# Patient Record
Sex: FEMALE | Race: WHITE | Employment: FULL TIME | URBAN - METROPOLITAN AREA
[De-identification: names, ages, dates, MRNs, and addresses within clinical notes are randomized per-mention and may not be internally consistent; named-entity substitution may affect disease eponyms.]

---

## 2017-04-12 ENCOUNTER — HOSPITAL ENCOUNTER (OUTPATIENT)
Dept: ULTRASOUND IMAGING | Age: 32
Discharge: HOME | End: 2017-04-12
Attending: FAMILY MEDICINE
Payer: COMMERCIAL

## 2017-04-12 DIAGNOSIS — K82.8 GALLBLADDER SLUDGE: ICD-10-CM

## 2017-04-12 DIAGNOSIS — R10.9 ABDOMINAL PAIN: ICD-10-CM

## 2017-04-12 PROCEDURE — 76705 ECHO EXAM OF ABDOMEN: CPT

## 2019-06-17 ENCOUNTER — HOSPITAL ENCOUNTER (EMERGENCY)
Age: 34
Discharge: HOME | End: 2019-06-17
Attending: EMERGENCY MEDICINE
Payer: COMMERCIAL

## 2019-06-17 ENCOUNTER — APPOINTMENT (OUTPATIENT)
Dept: ULTRASOUND IMAGING | Age: 34
End: 2019-06-17
Attending: EMERGENCY MEDICINE
Payer: COMMERCIAL

## 2019-06-17 ENCOUNTER — APPOINTMENT (OUTPATIENT)
Dept: CT IMAGING | Age: 34
End: 2019-06-17
Attending: EMERGENCY MEDICINE
Payer: COMMERCIAL

## 2019-06-17 ENCOUNTER — APPOINTMENT (OUTPATIENT)
Dept: GENERAL RADIOLOGY | Age: 34
End: 2019-06-17
Attending: EMERGENCY MEDICINE
Payer: COMMERCIAL

## 2019-06-17 VITALS
OXYGEN SATURATION: 94 % | TEMPERATURE: 98 F | SYSTOLIC BLOOD PRESSURE: 106 MMHG | HEART RATE: 76 BPM | RESPIRATION RATE: 19 BRPM | DIASTOLIC BLOOD PRESSURE: 69 MMHG

## 2019-06-17 DIAGNOSIS — G89.18 POSTOPERATIVE PAIN: Primary | ICD-10-CM

## 2019-06-17 LAB
ALBUMIN SERPL-MCNC: 3.7 G/DL (ref 3.4–5)
ALBUMIN/GLOB SERPL: 1 {RATIO} (ref 0.8–1.7)
ALP SERPL-CCNC: 81 U/L (ref 45–117)
ALT SERPL-CCNC: 48 U/L (ref 13–56)
ANION GAP SERPL CALC-SCNC: 7 MMOL/L (ref 3–18)
APPEARANCE UR: CLEAR
AST SERPL-CCNC: 22 U/L (ref 15–37)
ATRIAL RATE: 84 BPM
BACTERIA URNS QL MICRO: ABNORMAL /HPF
BASOPHILS # BLD: 0 K/UL (ref 0–0.1)
BASOPHILS NFR BLD: 0 % (ref 0–2)
BILIRUB SERPL-MCNC: 0.4 MG/DL (ref 0.2–1)
BILIRUB UR QL: NEGATIVE
BUN SERPL-MCNC: 10 MG/DL (ref 7–18)
BUN/CREAT SERPL: 14 (ref 12–20)
CALCIUM SERPL-MCNC: 8.9 MG/DL (ref 8.5–10.1)
CALCULATED P AXIS, ECG09: 9 DEGREES
CALCULATED R AXIS, ECG10: 15 DEGREES
CALCULATED T AXIS, ECG11: 14 DEGREES
CHLORIDE SERPL-SCNC: 103 MMOL/L (ref 100–108)
CK MB CFR SERPL CALC: NORMAL % (ref 0–4)
CK MB SERPL-MCNC: <1 NG/ML (ref 5–25)
CK SERPL-CCNC: 82 U/L (ref 26–192)
CO2 SERPL-SCNC: 29 MMOL/L (ref 21–32)
COLOR UR: YELLOW
CREAT SERPL-MCNC: 0.71 MG/DL (ref 0.6–1.3)
DIAGNOSIS, 93000: NORMAL
DIFFERENTIAL METHOD BLD: ABNORMAL
EOSINOPHIL # BLD: 0.2 K/UL (ref 0–0.4)
EOSINOPHIL NFR BLD: 2 % (ref 0–5)
EPITH CASTS URNS QL MICRO: ABNORMAL /LPF (ref 0–5)
ERYTHROCYTE [DISTWIDTH] IN BLOOD BY AUTOMATED COUNT: 13.2 % (ref 11.6–14.5)
GLOBULIN SER CALC-MCNC: 3.6 G/DL (ref 2–4)
GLUCOSE SERPL-MCNC: 179 MG/DL (ref 74–99)
GLUCOSE UR STRIP.AUTO-MCNC: NEGATIVE MG/DL
HCG UR QL: NEGATIVE
HCT VFR BLD AUTO: 33.6 % (ref 35–45)
HGB BLD-MCNC: 11.7 G/DL (ref 12–16)
HGB UR QL STRIP: ABNORMAL
KETONES UR QL STRIP.AUTO: ABNORMAL MG/DL
LEUKOCYTE ESTERASE UR QL STRIP.AUTO: ABNORMAL
LIPASE SERPL-CCNC: 276 U/L (ref 73–393)
LYMPHOCYTES # BLD: 3.6 K/UL (ref 0.9–3.6)
LYMPHOCYTES NFR BLD: 42 % (ref 21–52)
MCH RBC QN AUTO: 30 PG (ref 24–34)
MCHC RBC AUTO-ENTMCNC: 34.8 G/DL (ref 31–37)
MCV RBC AUTO: 86.2 FL (ref 74–97)
MONOCYTES # BLD: 0.3 K/UL (ref 0.05–1.2)
MONOCYTES NFR BLD: 4 % (ref 3–10)
MUCOUS THREADS URNS QL MICRO: ABNORMAL /LPF
NEUTS SEG # BLD: 4.3 K/UL (ref 1.8–8)
NEUTS SEG NFR BLD: 52 % (ref 40–73)
NITRITE UR QL STRIP.AUTO: NEGATIVE
P-R INTERVAL, ECG05: 162 MS
PH UR STRIP: 6.5 [PH] (ref 5–8)
PLATELET # BLD AUTO: 239 K/UL (ref 135–420)
PMV BLD AUTO: 10.7 FL (ref 9.2–11.8)
POTASSIUM SERPL-SCNC: 3.6 MMOL/L (ref 3.5–5.5)
PROT SERPL-MCNC: 7.3 G/DL (ref 6.4–8.2)
PROT UR STRIP-MCNC: 30 MG/DL
Q-T INTERVAL, ECG07: 376 MS
QRS DURATION, ECG06: 86 MS
QTC CALCULATION (BEZET), ECG08: 444 MS
RBC # BLD AUTO: 3.9 M/UL (ref 4.2–5.3)
RBC #/AREA URNS HPF: ABNORMAL /HPF (ref 0–5)
SODIUM SERPL-SCNC: 139 MMOL/L (ref 136–145)
SP GR UR REFRACTOMETRY: 1.02 (ref 1–1.03)
TROPONIN I SERPL-MCNC: <0.02 NG/ML (ref 0–0.04)
UROBILINOGEN UR QL STRIP.AUTO: 1 EU/DL (ref 0.2–1)
VENTRICULAR RATE, ECG03: 84 BPM
WBC # BLD AUTO: 8.4 K/UL (ref 4.6–13.2)
WBC URNS QL MICRO: ABNORMAL /HPF (ref 0–5)

## 2019-06-17 PROCEDURE — 74011250636 HC RX REV CODE- 250/636: Performed by: EMERGENCY MEDICINE

## 2019-06-17 PROCEDURE — 76705 ECHO EXAM OF ABDOMEN: CPT

## 2019-06-17 PROCEDURE — 96375 TX/PRO/DX INJ NEW DRUG ADDON: CPT

## 2019-06-17 PROCEDURE — 74011636320 HC RX REV CODE- 636/320: Performed by: EMERGENCY MEDICINE

## 2019-06-17 PROCEDURE — 83690 ASSAY OF LIPASE: CPT

## 2019-06-17 PROCEDURE — 71275 CT ANGIOGRAPHY CHEST: CPT

## 2019-06-17 PROCEDURE — 81001 URINALYSIS AUTO W/SCOPE: CPT

## 2019-06-17 PROCEDURE — 85025 COMPLETE CBC W/AUTO DIFF WBC: CPT

## 2019-06-17 PROCEDURE — 82550 ASSAY OF CK (CPK): CPT

## 2019-06-17 PROCEDURE — 71045 X-RAY EXAM CHEST 1 VIEW: CPT

## 2019-06-17 PROCEDURE — 80053 COMPREHEN METABOLIC PANEL: CPT

## 2019-06-17 PROCEDURE — 74011250636 HC RX REV CODE- 250/636

## 2019-06-17 PROCEDURE — 99283 EMERGENCY DEPT VISIT LOW MDM: CPT

## 2019-06-17 PROCEDURE — 96374 THER/PROPH/DIAG INJ IV PUSH: CPT

## 2019-06-17 PROCEDURE — 93005 ELECTROCARDIOGRAM TRACING: CPT

## 2019-06-17 PROCEDURE — 81025 URINE PREGNANCY TEST: CPT

## 2019-06-17 RX ORDER — KETOROLAC TROMETHAMINE 30 MG/ML
30 INJECTION, SOLUTION INTRAMUSCULAR; INTRAVENOUS
Status: COMPLETED | OUTPATIENT
Start: 2019-06-17 | End: 2019-06-17

## 2019-06-17 RX ORDER — FENTANYL CITRATE 50 UG/ML
50 INJECTION, SOLUTION INTRAMUSCULAR; INTRAVENOUS
Status: COMPLETED | OUTPATIENT
Start: 2019-06-17 | End: 2019-06-17

## 2019-06-17 RX ORDER — ONDANSETRON 2 MG/ML
INJECTION INTRAMUSCULAR; INTRAVENOUS
Status: DISCONTINUED
Start: 2019-06-17 | End: 2019-06-17 | Stop reason: HOSPADM

## 2019-06-17 RX ORDER — FENTANYL CITRATE 50 UG/ML
INJECTION, SOLUTION INTRAMUSCULAR; INTRAVENOUS
Status: DISCONTINUED
Start: 2019-06-17 | End: 2019-06-17 | Stop reason: HOSPADM

## 2019-06-17 RX ORDER — ONDANSETRON 2 MG/ML
4 INJECTION INTRAMUSCULAR; INTRAVENOUS
Status: COMPLETED | OUTPATIENT
Start: 2019-06-17 | End: 2019-06-17

## 2019-06-17 RX ADMIN — ONDANSETRON HYDROCHLORIDE 4 MG: 2 SOLUTION INTRAMUSCULAR; INTRAVENOUS at 01:35

## 2019-06-17 RX ADMIN — KETOROLAC TROMETHAMINE 30 MG: 30 INJECTION, SOLUTION INTRAMUSCULAR; INTRAVENOUS at 08:11

## 2019-06-17 RX ADMIN — FENTANYL CITRATE 50 MCG: 50 INJECTION, SOLUTION INTRAMUSCULAR; INTRAVENOUS at 01:35

## 2019-06-17 RX ADMIN — IOPAMIDOL 100 ML: 755 INJECTION, SOLUTION INTRAVENOUS at 05:37

## 2019-06-17 NOTE — DISCHARGE INSTRUCTIONS
Patient Education        Acute Pain After Surgery: Care Instructions  Your Care Instructions    It's common to have some pain after surgery. Pain doesn't mean that something is wrong or that the surgery didn't go well. But when the pain is severe, it's important to work with your doctor to manage it. It's also important to be aware of a few facts about pain and pain medicine. · You are the only person who knows what your pain feels like. So be sure to tell your doctor when you are in pain or when the pain changes. Then he or she will know how to adjust your medicines. · Pain is often easier to control right after it starts. So it may be better to take regular doses of pain medicine and not wait until the pain gets bad. · Medicine can help control pain. But this doesn't mean you'll have no pain. Medicine works to keep the pain at a level you can live with. With time, you will feel better. Follow-up care is a key part of your treatment and safety. Be sure to make and go to all appointments, and call your doctor if you are having problems. It's also a good idea to know your test results and keep a list of the medicines you take. How can you care for yourself at home? · Be safe with medicines. Read and follow all instructions on the label. ? If the doctor gave you a prescription medicine for pain, take it as prescribed. ? If you are not taking a prescription pain medicine, ask your doctor if you can take an over-the-counter medicine. · If you take an over-the-counter pain medicine, such as acetaminophen (Tylenol), ibuprofen (Advil, Motrin), or naproxen (Aleve), read and follow all instructions on the label. · Do not take two or more pain medicines at the same time unless the doctor told you to. · Do not drink alcohol while you are taking pain medicines. · Try to walk each day if your doctor recommends it. Start by walking a little more than you did the day before. Bit by bit, increase the amount you walk. Walking increases blood flow. It also helps prevent pneumonia and constipation. · To prevent constipation from opioid pain medicines:  ? Talk to your doctor about a laxative. ? Include fruits, vegetables, beans, and whole grains in your diet each day. These foods are high in fiber. ? Drink plenty of fluids, enough so that your urine is light yellow or clear like water. Drink water, fruit juice, or other drinks that do not contain caffeine or alcohol. If you have kidney, heart, or liver disease and have to limit fluids, talk with your doctor before you increase the amount of fluids you drink. ? Take a fiber supplement, such as Citrucel or Metamucil, every day if needed. Read and follow all instructions on the label. If you take pain medicine for more than a few days, talk to your doctor before you take fiber. When should you call for help? Call your doctor now or seek immediate medical care if:    · Your pain gets worse.     · Your pain is not controlled by medicine.    Watch closely for changes in your health, and be sure to contact your doctor if you have any problems. Where can you learn more? Go to http://krissy-filiberto.info/. Enter ([phone] in the search box to learn more about \"Acute Pain After Surgery: Care Instructions. \"  Current as of: September 23, 2018  Content Version: 11.9  © 4486-0829 [employer]. Care instructions adapted under license by [employer] (which disclaims liability or warranty for this information). If you have questions about a medical condition or this instruction, always ask your healthcare professional. [place] [address] any warranty or liability for your use of this information.

## 2019-06-17 NOTE — ED TRIAGE NOTES
Pt post op (Wednesday had laparoscopic cholecystectomy) c/o sudden onset of stabbing epigastric pain and significant chest pressure causing her to feel like she cannot take a deep breath. Afebrile, no dyspnea or distress, pt recently d/c'd Roxicodone and switched over to tylenol, denies motrin or Asprin. Incision sites appear appropriate in healing time and are intact, no signs of infection observed, VS WDL.

## 2019-06-17 NOTE — ED PROVIDER NOTES
EMERGENCY DEPARTMENT HISTORY AND PHYSICAL EXAM    1:07 AM      Date: 2019  Patient Name: Rambo Hammer    History of Presenting Illness     Chief Complaint   Patient presents with    Post-Op Problem         History Provided By: Patient      Additional History (Context): Rambo Hammer is a 34 y.o. female with asthma who presents with acute epigastric abdominal pain onset immediately PTA. Pt states she felt chest tightness and shortness of breath. Pt has laparoscopic cholecystectomy 19 at [facility]. Pt states she felt stabbing pain and called her surgeon. Surgeon told patient to go to [facility] but she states she did not think she could be able to make it there. She denies fever, vomiting, diarrhea, and wheezing. Pt has been taking tylenol for pain and stopped taking her prescribed Roxicodone on 6/15/19. No other concerns or symptoms at this time. PCP: Mathew Urena MD    Chief Complaint: Abdominal Pain  Duration:  immediately PTA  Timing:  Acute  Location: Epigastric  Quality: Cramping  Severity: N/A  Modifying Factors:  tylenol for pain and stopped taking her Rx pain medications on 6/15/19, Surgery on 19  Associated Symptoms: shortness of breath, chest tightness    Current Facility-Administered Medications   Medication Dose Route Frequency Provider Last Rate Last Dose    ondansetron (ZOFRAN) 4 mg/2 mL injection             fentaNYL citrate (PF) 50 mcg/mL injection              Current Outpatient Medications   Medication Sig Dispense Refill    albuterol (PROVENTIL HFA, VENTOLIN HFA, PROAIR HFA) 90 mcg/actuation inhaler Take 2 Puffs by inhalation every four (4) hours as needed for Wheezing or Shortness of Breath.  1 Inhaler 1       Past History     Past Medical History:  Past Medical History:   Diagnosis Date    Asthma        Past Surgical History:  Past Surgical History:   Procedure Laterality Date    HX APPENDECTOMY      HX  SECTION      HX DILATION AND CURETTAGE         Family History:  History reviewed. No pertinent family history. Social History:  Social History     Tobacco Use    Smoking status: Never Smoker   Substance Use Topics    Alcohol use: Yes    Drug use: Not on file       Allergies: Allergies   Allergen Reactions    Seafood Anaphylaxis    Amoxicillin Hives and Nausea and Vomiting    Erythromycin Hives and Nausea and Vomiting    Keflex [Cephalexin] Hives and Nausea and Vomiting    Sulfa (Sulfonamide Antibiotics) Hives and Nausea and Vomiting         Review of Systems       Review of Systems   Constitutional: Negative for activity change, fatigue and fever. HENT: Negative for congestion and rhinorrhea. Eyes: Negative for visual disturbance. Respiratory: Positive for chest tightness and shortness of breath. Negative for wheezing. Cardiovascular: Negative for chest pain and palpitations. Gastrointestinal: Positive for abdominal pain. Negative for diarrhea, nausea and vomiting. Genitourinary: Negative for dysuria and hematuria. Musculoskeletal: Negative for back pain. Skin: Negative for rash. Neurological: Negative for dizziness, weakness and light-headedness. All other systems reviewed and are negative. Physical Exam   There were no vitals taken for this visit. Physical Exam   Constitutional: She is oriented to person, place, and time. She appears well-developed and well-nourished. No distress. HENT:   Head: Normocephalic and atraumatic. Right Ear: External ear normal.   Left Ear: External ear normal.   Nose: Nose normal.   Mouth/Throat: Oropharynx is clear and moist.   Eyes: Pupils are equal, round, and reactive to light. Conjunctivae and EOM are normal.   Neck: Normal range of motion. Neck supple. No tracheal deviation present. Cardiovascular: Normal rate, regular rhythm and intact distal pulses. Pulmonary/Chest: Effort normal and breath sounds normal. She exhibits no tenderness. Abdominal: Soft.  Bowel sounds are normal. She exhibits no distension. There is tenderness in the right upper quadrant. There is no rebound and no guarding. Surgical incisions clean, dry, and intact with no erythema or discharge. Musculoskeletal: Normal range of motion. She exhibits no edema or tenderness. Neurological: She is alert and oriented to person, place, and time. No cranial nerve deficit. Coordination normal.   Skin: Skin is warm and dry. Psychiatric: She has a normal mood and affect. Her behavior is normal. Judgment and thought content normal.   Nursing note and vitals reviewed. Diagnostic Study Results     Labs -  Recent Results (from the past 12 hour(s))   CBC WITH AUTOMATED DIFF    Collection Time: 06/17/19  1:18 AM   Result Value Ref Range    WBC 8.4 4.6 - 13.2 K/uL    RBC 3.90 (L) 4.20 - 5.30 M/uL    HGB 11.7 (L) 12.0 - 16.0 g/dL    HCT 33.6 (L) 35.0 - 45.0 %    MCV 86.2 74.0 - 97.0 FL    MCH 30.0 24.0 - 34.0 PG    MCHC 34.8 31.0 - 37.0 g/dL    RDW 13.2 11.6 - 14.5 %    PLATELET 402 206 - 981 K/uL    MPV 10.7 9.2 - 11.8 FL    NEUTROPHILS 52 40 - 73 %    LYMPHOCYTES 42 21 - 52 %    MONOCYTES 4 3 - 10 %    EOSINOPHILS 2 0 - 5 %    BASOPHILS 0 0 - 2 %    ABS. NEUTROPHILS 4.3 1.8 - 8.0 K/UL    ABS. LYMPHOCYTES 3.6 0.9 - 3.6 K/UL    ABS. MONOCYTES 0.3 0.05 - 1.2 K/UL    ABS. EOSINOPHILS 0.2 0.0 - 0.4 K/UL    ABS.  BASOPHILS 0.0 0.0 - 0.1 K/UL    DF AUTOMATED     METABOLIC PANEL, COMPREHENSIVE    Collection Time: 06/17/19  1:18 AM   Result Value Ref Range    Sodium 139 136 - 145 mmol/L    Potassium 3.6 3.5 - 5.5 mmol/L    Chloride 103 100 - 108 mmol/L    CO2 29 21 - 32 mmol/L    Anion gap 7 3.0 - 18 mmol/L    Glucose 179 (H) 74 - 99 mg/dL    BUN 10 7.0 - 18 MG/DL    Creatinine 0.71 0.6 - 1.3 MG/DL    BUN/Creatinine ratio 14 12 - 20      GFR est AA >60 >60 ml/min/1.73m2    GFR est non-AA >60 >60 ml/min/1.73m2    Calcium 8.9 8.5 - 10.1 MG/DL    Bilirubin, total 0.4 0.2 - 1.0 MG/DL    ALT (SGPT) 48 13 - 56 U/L    AST (SGOT) 22 15 - 37 U/L    Alk. phosphatase 81 45 - 117 U/L    Protein, total 7.3 6.4 - 8.2 g/dL    Albumin 3.7 3.4 - 5.0 g/dL    Globulin 3.6 2.0 - 4.0 g/dL    A-G Ratio 1.0 0.8 - 1.7     CARDIAC PANEL,(CK, CKMB & TROPONIN)    Collection Time: 06/17/19  1:18 AM   Result Value Ref Range    CK 82 26 - 192 U/L    CK - MB <1.0 <3.6 ng/ml    CK-MB Index  0.0 - 4.0 %     CALCULATION NOT PERFORMED WHEN RESULT IS BELOW LINEAR LIMIT    Troponin-I, QT <0.02 0.0 - 0.045 NG/ML   LIPASE    Collection Time: 06/17/19  1:18 AM   Result Value Ref Range    Lipase 276 73 - 393 U/L   HCG URINE, QL    Collection Time: 06/17/19  1:23 AM   Result Value Ref Range    HCG urine, QL NEGATIVE  NEG     URINALYSIS W/ RFLX MICROSCOPIC    Collection Time: 06/17/19  1:23 AM   Result Value Ref Range    Color YELLOW      Appearance CLEAR      Specific gravity 1.024 1.005 - 1.030      pH (UA) 6.5 5.0 - 8.0      Protein 30 (A) NEG mg/dL    Glucose NEGATIVE  NEG mg/dL    Ketone TRACE (A) NEG mg/dL    Bilirubin NEGATIVE  NEG      Blood MODERATE (A) NEG      Urobilinogen 1.0 0.2 - 1.0 EU/dL    Nitrites NEGATIVE  NEG      Leukocyte Esterase TRACE (A) NEG     URINE MICROSCOPIC ONLY    Collection Time: 06/17/19  1:23 AM   Result Value Ref Range    WBC 0 to 3 0 - 5 /hpf    RBC 11 to 20 0 - 5 /hpf    Epithelial cells 2+ 0 - 5 /lpf    Bacteria FEW (A) NEG /hpf    Mucus 1+ (A) NEG /lpf   EKG, 12 LEAD, INITIAL    Collection Time: 06/17/19  1:32 AM   Result Value Ref Range    Ventricular Rate 84 BPM    Atrial Rate 84 BPM    P-R Interval 162 ms    QRS Duration 86 ms    Q-T Interval 376 ms    QTC Calculation (Bezet) 444 ms    Calculated P Axis 9 degrees    Calculated R Axis 15 degrees    Calculated T Axis 14 degrees    Diagnosis       Normal sinus rhythm  Normal ECG  When compared with ECG of 24-JUN-2015 16:41,  No significant change was found         Radiologic Studies -   CTA CHEST W OR W WO CONT   Final Result   IMPRESSION[de-identified]      1.  Negative for acute PE.  No alternative acute chest findings. US [facility]   Final Result   IMPRESSION[de-identified]      1.  Hepatomegaly with increased echotexture as from steatosis. 2. Postcholecystectomy findings, no acute/concerning features. Thank you for this referral.      XR CHEST PORT    (Results Pending)         Medical Decision Making     It should be noted that I, Felipa Russell DO will be the provider of record for this patient. I reviewed the vital signs, available nursing notes, past medical history, past surgical history, family history and social history. Vital Signs-Reviewed the patient's vital signs. Pulse Oximetry Analysis -  98% on room air , nml    Cardiac Monitor:  Rate: 89 BPM    EKG: Interpreted by the EP. Time Interpreted: 01:32 AM   Rate: 84 BPM   Rhythm: Normal Sinus Rhythm    Interpretation: No acute changes    Records Reviewed: Nursing Notes and Old Medical Records (Time of Review: 1:07 AM)    ED Course: Progress Notes, Reevaluation, and Consults:  3:26 AM  Patient is resting comfortably. Awaiting US read. 4:45 AM  No acute findings on ultrasound. Patient informed of results. Pain controlled. CTA chest will be ordered for further evaluation of pain. 6:28 AM  Patient resting comfortably. CTA chest pending. Provider Notes (Medical Decision Making):   Patient is a 72-year-old female who comes in quadrant pain with recent cholecystectomy. No acute findings on blood work, ultrasound. CTA chest performed to evaluate for pulmonary embolus. No acute findings on CT chest.  Patient has been resting comfortably since fentanyl was given. She has not been taking opiate pain medication at home because she is trying to manage her children. Patient does have pain medication at home. She is instructed to follow-up with her surgeon this week. Stable for discharge. Understands and agrees with plan. Diagnosis     Clinical Impression:   1.  Postoperative pain        Disposition:     Follow-up Information    None          Patient's Medications   Start Taking    No medications on file   Continue Taking    ALBUTEROL (PROVENTIL HFA, VENTOLIN HFA, PROAIR HFA) 90 MCG/ACTUATION INHALER    Take 2 Puffs by inhalation every four (4) hours as needed for Wheezing or Shortness of Breath. These Medications have changed    No medications on file   Stop Taking    No medications on file     _______________________________       93 Rabia Emerson acting as a scribe for and in the presence of Paulie Blair DO      June 17, 2019 at 7:37 AM       Provider Attestation:      I personally performed the services described in the documentation, reviewed the documentation, as recorded by the scribe in my presence, and it accurately and completely records my words and actions.  June 17, 2019 at 7:37 AM - Paulie CEJA DO       _______________________________

## 2019-10-21 PROBLEM — R10.84 PAIN, ABDOMINAL, GENERALIZED: Status: ACTIVE | Noted: 2019-10-21

## 2019-10-21 PROBLEM — R11.2 NAUSEA & VOMITING: Status: ACTIVE | Noted: 2019-10-21

## 2019-10-21 PROBLEM — R19.7 DIARRHEA: Status: ACTIVE | Noted: 2019-10-21

## 2020-02-27 ENCOUNTER — HOSPITAL ENCOUNTER (OUTPATIENT)
Dept: OCCUPATIONAL MEDICINE | Age: 35
Discharge: HOME | End: 2020-02-27
Attending: EMERGENCY MEDICINE

## 2020-02-27 DIAGNOSIS — Z11.1 SCREENING-PULMONARY TB: ICD-10-CM

## 2020-03-05 ENCOUNTER — HOSPITAL ENCOUNTER (OUTPATIENT)
Dept: LAB | Age: 35
Discharge: HOME | End: 2020-03-05

## 2020-03-05 LAB — XX-LABCORP SPECIMEN COL,LCBCF: NORMAL

## 2020-03-05 PROCEDURE — 99001 SPECIMEN HANDLING PT-LAB: CPT

## 2020-04-24 ENCOUNTER — VIRTUAL VISIT (OUTPATIENT)
Dept: SURGERY | Age: 35
End: 2020-04-24

## 2020-04-24 VITALS — HEIGHT: 67 IN | BODY MASS INDEX: 41.44 KG/M2 | WEIGHT: 264 LBS

## 2020-04-24 DIAGNOSIS — E66.01 MORBID OBESITY (HCC): Primary | ICD-10-CM

## 2020-04-24 DIAGNOSIS — K90.9 DIARRHEA DUE TO MALABSORPTION: ICD-10-CM

## 2020-04-24 DIAGNOSIS — R11.2 INTRACTABLE VOMITING WITH NAUSEA, UNSPECIFIED VOMITING TYPE: ICD-10-CM

## 2020-04-24 DIAGNOSIS — R19.7 DIARRHEA DUE TO MALABSORPTION: ICD-10-CM

## 2020-04-24 DIAGNOSIS — J45.20 MILD INTERMITTENT REACTIVE AIRWAY DISEASE WITHOUT COMPLICATION: ICD-10-CM

## 2020-04-24 DIAGNOSIS — E11.9 CONTROLLED TYPE 2 DIABETES MELLITUS WITHOUT COMPLICATION, WITHOUT LONG-TERM CURRENT USE OF INSULIN (HCC): ICD-10-CM

## 2020-04-24 RX ORDER — LISINOPRIL 2.5 MG/1
TABLET ORAL DAILY
COMMUNITY
End: 2021-03-16

## 2020-04-24 RX ORDER — ESTRADIOL 0.5 MG/1
1 TABLET ORAL DAILY
COMMUNITY
End: 2021-07-27

## 2020-04-24 RX ORDER — ESCITALOPRAM OXALATE 20 MG/1
20 TABLET ORAL DAILY
COMMUNITY
End: 2021-03-16

## 2020-04-24 NOTE — PROGRESS NOTES
Leslee Fragoso is a 30 y.o. female who presents today with   Chief Complaint   Patient presents with    Morbid Obesity     Consult     Body mass index is 41.35 kg/m².

## 2020-04-24 NOTE — PROGRESS NOTES
Initial Telemedicine Consultation for Bariatric Surgery Template   Consent:  Patient is aware this patient-initiated Telehealth encounter is a billable service, with coverage as determined by their insurance carrier. The patient is aware they may receive a bill and has provided verbal consent to proceed:     Provider location while conducting visit: in the office   Tele modality: Doxy. me  Patient location: Home  Other person participating in the telehealth services: Sabra Johnson MA   Visit start: 200  Visit end: [address] Elkins is a 30 y.o. female who wishes to discuss surgical options for the treatment of morbid obesity. The patient initially identified obesity at the age of 34 and at age 27 weighed 170lbs. She has tried a variety of unsupervised weight-loss attempts including self imposed, registered dietician and family physician, but has yet to meet with lasting success. Maximum weight lost on a diet is about 15 lbs, but that the weight loss always seems to return. Today, the patient is  Height: 5' 7\" (170.2 cm) tall, Weight: 119.7 kg (264 lb) lbs for a Body mass index is 41.35 kg/m². It is due to the patient's severe obesity, which is further complicated by adult onset diabetes mellitus and reactive airway disease  that the patient is now seeking out bariatric surgery. She reports she had her gallbladder removed one year ago and has had severe GI issues since resulting in significant changes in her diet since. She is totally intolerant of salad, fried foods, many fruits, sweetened beverages, sweets and spicy foods. She can no longer tolerate pasta or bread. She has been tested for celiac and Crohn's. Both work-ups have been negative thus far. She was started on Cholestyramine and bentyl. She says she vomits several times a week in response to eating and has diarrhea 4-5 times a week which is an improvement from 7-10 times a week prior to Bentyl.  She says cholestyramine helped with the diarrhea but made bloating worse. She does report that her symptoms improved with a PPI but she is not on one now. She has changed GI docs due to frustration with her lack of improvement. With all these dietary restriction, she has had a climbing A1C and no more than 15 lbs of weight loss in the last year remaining at her current plateau since January. She also has an implanted CV recorder for recurring tachycardia. She had a hysterectomy about 6 months ago on the premise that her symptoms might be related to GYN issues without improvement in her symptoms. Past Medical History:   Diagnosis Date    Abdominal pain     Adverse effect of anesthesia     SOB    Anxiety     Arrhythmia     SVT    Asthma     Diabetes (HCC)     borderline    Diarrhea     Dizziness     Kidney stones     Nausea and vomiting        Past Surgical History:   Procedure Laterality Date    CARDIAC SURG PROCEDURE UNLIST  10/2019    loop recoder    COLONOSCOPY N/A 10/21/2019    DIAGNOSTIC COLONOSCOPY w/ Bx performed by Eva Whitley MD at [address] [place] [address] HX APPENDECTOMY      HX  SECTION      HX DILATION AND CURETTAGE      HX HYSTEROSCOPY WITH ENDOMETRIAL ABLATION      HX LAP CHOLECYSTECTOMY      HX LITHOTRIPSY      HX PARTIAL HYSTERECTOMY  2019       Current Outpatient Medications on File Prior to Visit   Medication Sig Dispense Refill    estradioL (ESTRACE) 0.5 mg tablet Take  by mouth daily.  lisinopriL (PRINIVIL, ZESTRIL) 2.5 mg tablet Take  by mouth daily.  liraglutide (VICTOZA) 0.6 mg/0.1 mL (18 mg/3 mL) pnij 0.6 mg by SubCUTAneous route.  escitalopram oxalate (LEXAPRO) 20 mg tablet Take 20 mg by mouth daily.  metoprolol succinate (TOPROL-XL) 25 mg XL tablet Take  by mouth daily.  dicyclomine (BENTYL) 10 mg capsule Take 10 mg by mouth 4 times daily (before meals and nightly).       albuterol (PROVENTIL HFA, VENTOLIN HFA, PROAIR HFA) 90 mcg/actuation inhaler Take 2 Puffs by inhalation every four (4) hours as needed for Wheezing or Shortness of Breath. 1 Inhaler 1    meclizine (ANTIVERT) 25 mg tablet Take  by mouth three (3) times daily as needed. No current facility-administered medications on file prior to visit. Allergies   Allergen Reactions    Seafood Anaphylaxis    Amoxicillin Hives and Nausea and Vomiting    Erythromycin Hives and Nausea and Vomiting    Hydromorphone Other (comments)     Chest tightness, warm sensation to neck and face, tachycardia, palpitations, hallucinations.     Keflex [Cephalexin] Hives and Nausea and Vomiting    Sulfa (Sulfonamide Antibiotics) Hives and Nausea and Vomiting       Social History     Tobacco Use    Smoking status: Never Smoker    Smokeless tobacco: Never Used   Substance Use Topics    Alcohol use: Yes     Comment: rare    Drug use: Never       Family History   Problem Relation Age of Onset   [de-identified] Stroke Mother     Diabetes Mother     Heart Disease Mother     Cancer Father         prostate    Dementia Father     Parkinson's Disease Father     Kidney Disease Father        Family Status   Relation Name Status    Mother  [de-identified]    Father  Alive       Review of Systems:  Positive in BOLD    CONST: Fever, weight loss, fatigue or chills  GI: Nausea, vomiting, abdominal pain, change in bowel habits, hematochezia, melena, and GERD, diarrhea  INTEG: Dermatitis, abnormal moles  HEENT: Recent changes in vision, vertigo, epistaxis, dysphagia and hoarseness  CV: Chest pain, palpitations, HTN, edema and varicosities  RESP: Cough, shortness of breath, wheezing, hemoptysis, snoring and reactive airway disease  : Hematuria, dysuria, frequency, urgency, nocturia and stress urinary incontinence   MS: Weakness, joint pain and arthritis  ENDO: Diabetes, thyroid disease, polyuria, polydipsia, polyphagia, poor wound healing, heat intolerance, cold intolerance  LYMPH/HEME: Anemia, bruising and history of blood transfusions  NEURO: Dizziness, headache, fainting, seizures and stroke  PSYCH: Anxiety and depression      Physical Exam    Visit Vitals  Ht 5' 7\" (1.702 m)   Wt 119.7 kg (264 lb)   BMI 41.35 kg/m²       Pre op weight: 264  EBW: 124  Wt loss to date: 0       General: 30 y.o.) female in no acute distress. Morbidly obese in abdomen  HEENT: Normocephalic, atraumatic, Pupils equal and reactive  CV:  No edema or varicosities. Psych: Alert and oriented to person, place, and time. CTs - basically normal  SBFT - ileal increased folding - question for celiac  Path - not all avail for review - what was available was normal    COLO and EGD - wnl    Impression:    Francois Yin is a 30 y.o. female who is suffering from morbid obesity with a self-reported BMI of 41  and comorbidities including adult onset diabetes mellitus, GERD, reactive airway disease and severe GI complaints of nasuea, vomiting, and diarrhea. I do not think she would benefit from weight loss surgery until her GI complaints are answered and corrected. We have had an extensive discussion with regard to the risks, benefits and likely outcomes WLS and also her underlying symptomatology. We discussed causes for post-remedios symptoms and GERD symptoms and vomiting. This counseling concluded with a plan to work with her GI to address her problems, make dietary changes with a plan to avoid spices, oils and fats. Consider restarting cholestyramine and PPI and come back in 2 months for an in person visit to see if she would be a candidate at that time.

## 2020-04-24 NOTE — LETTER
4/24/2020 11:28 AM 
 
Patient:  Vik Pedro YOB: 1985 Date of Visit: 4/24/2020 Jose Valle MD 
[address] [facility] [address] [facility] [address] VIA In Basket Dear Jose Valle MD, Thank you for referring Ms. Vik Pedro to [place] [address] for evaluation and treatment. Below are the relevant portions of my assessment and plan of care. Thank you very much for your referral of Ms. Vik Pedro. If you have questions, please do not hesitate to call me. I look forward to following Ms. Prieto Daly along with you and will keep you updated as to her progress.   
 
 
 
 
Sincerely, 
 
 
Ema Shearer MD

## 2020-06-11 ENCOUNTER — DOCUMENTATION ONLY (OUTPATIENT)
Dept: SURGERY | Age: 35
End: 2020-06-11

## 2020-06-11 NOTE — PROGRESS NOTES
Spoke with pt, she reports her COVID19 test came back neg and she is feeling much improved. She would like to reschedule her appt with Dr. Otoniel Edge as she will be attending a [facility] service for her father at that time, transferred to  for scheduling.

## 2022-03-18 PROBLEM — R19.7 DIARRHEA: Status: ACTIVE | Noted: 2019-10-21

## 2022-03-19 PROBLEM — E66.01 MORBID OBESITY: Status: ACTIVE | Noted: 2020-04-24

## 2022-03-19 PROBLEM — R11.2 NAUSEA & VOMITING: Status: ACTIVE | Noted: 2019-10-21

## 2022-03-20 PROBLEM — R10.84 PAIN, ABDOMINAL, GENERALIZED: Status: ACTIVE | Noted: 2019-10-21

## 2022-05-14 ENCOUNTER — HOSPITAL ENCOUNTER (EMERGENCY)
Age: 37
Discharge: HOME | End: 2022-05-14
Attending: EMERGENCY MEDICINE
Payer: MEDICAID

## 2022-05-14 VITALS
TEMPERATURE: 98.5 F | DIASTOLIC BLOOD PRESSURE: 93 MMHG | HEIGHT: 67 IN | HEART RATE: 116 BPM | WEIGHT: 263 LBS | SYSTOLIC BLOOD PRESSURE: 139 MMHG | BODY MASS INDEX: 41.28 KG/M2 | RESPIRATION RATE: 16 BRPM | OXYGEN SATURATION: 99 %

## 2022-05-14 DIAGNOSIS — I47.1 PSVT (PAROXYSMAL SUPRAVENTRICULAR TACHYCARDIA) (HCC): Primary | ICD-10-CM

## 2022-05-14 DIAGNOSIS — F41.1 ANXIETY STATE: ICD-10-CM

## 2022-05-14 DIAGNOSIS — E83.42 HYPOMAGNESEMIA: ICD-10-CM

## 2022-05-14 LAB
ALBUMIN SERPL-MCNC: 4 G/DL (ref 3.4–5)
ALBUMIN/GLOB SERPL: 1.1 {RATIO} (ref 0.8–1.7)
ALP SERPL-CCNC: 73 U/L (ref 45–117)
ALT SERPL-CCNC: 49 U/L (ref 13–56)
ANION GAP SERPL CALC-SCNC: 8 MMOL/L (ref 3–18)
AST SERPL-CCNC: 24 U/L (ref 10–38)
ATRIAL RATE: 107 BPM
BASOPHILS # BLD: 0 K/UL (ref 0–0.1)
BASOPHILS NFR BLD: 1 % (ref 0–2)
BILIRUB SERPL-MCNC: 1.7 MG/DL (ref 0.2–1)
BUN SERPL-MCNC: 11 MG/DL (ref 7–18)
BUN/CREAT SERPL: 16 (ref 12–20)
CALCIUM SERPL-MCNC: 9.9 MG/DL (ref 8.5–10.1)
CALCULATED P AXIS, ECG09: 35 DEGREES
CALCULATED R AXIS, ECG10: 18 DEGREES
CALCULATED T AXIS, ECG11: 22 DEGREES
CHLORIDE SERPL-SCNC: 104 MMOL/L (ref 100–111)
CO2 SERPL-SCNC: 25 MMOL/L (ref 21–32)
CREAT SERPL-MCNC: 0.69 MG/DL (ref 0.6–1.3)
DIAGNOSIS, 93000: NORMAL
DIFFERENTIAL METHOD BLD: NORMAL
EOSINOPHIL # BLD: 0.1 K/UL (ref 0–0.4)
EOSINOPHIL NFR BLD: 1 % (ref 0–5)
ERYTHROCYTE [DISTWIDTH] IN BLOOD BY AUTOMATED COUNT: 12.8 % (ref 11.6–14.5)
GLOBULIN SER CALC-MCNC: 3.8 G/DL (ref 2–4)
GLUCOSE SERPL-MCNC: 168 MG/DL (ref 74–99)
HCT VFR BLD AUTO: 38.6 % (ref 35–45)
HGB BLD-MCNC: 13.3 G/DL (ref 12–16)
IMM GRANULOCYTES # BLD AUTO: 0 K/UL (ref 0–0.04)
IMM GRANULOCYTES NFR BLD AUTO: 0 % (ref 0–0.5)
LYMPHOCYTES # BLD: 2.1 K/UL (ref 0.9–3.6)
LYMPHOCYTES NFR BLD: 26 % (ref 21–52)
MAGNESIUM SERPL-MCNC: 1.4 MG/DL (ref 1.6–2.6)
MCH RBC QN AUTO: 30 PG (ref 24–34)
MCHC RBC AUTO-ENTMCNC: 34.5 G/DL (ref 31–37)
MCV RBC AUTO: 87.1 FL (ref 78–100)
MONOCYTES # BLD: 0.4 K/UL (ref 0.05–1.2)
MONOCYTES NFR BLD: 5 % (ref 3–10)
NEUTS SEG # BLD: 5.2 K/UL (ref 1.8–8)
NEUTS SEG NFR BLD: 67 % (ref 40–73)
NRBC # BLD: 0 K/UL (ref 0–0.01)
NRBC BLD-RTO: 0 PER 100 WBC
P-R INTERVAL, ECG05: 162 MS
PLATELET # BLD AUTO: 264 K/UL (ref 135–420)
PMV BLD AUTO: 10.9 FL (ref 9.2–11.8)
POTASSIUM SERPL-SCNC: 3.5 MMOL/L (ref 3.5–5.5)
PROT SERPL-MCNC: 7.8 G/DL (ref 6.4–8.2)
Q-T INTERVAL, ECG07: 338 MS
QRS DURATION, ECG06: 80 MS
QTC CALCULATION (BEZET), ECG08: 451 MS
RBC # BLD AUTO: 4.43 M/UL (ref 4.2–5.3)
SODIUM SERPL-SCNC: 137 MMOL/L (ref 136–145)
TSH SERPL DL<=0.05 MIU/L-ACNC: 1.9 UIU/ML (ref 0.36–3.74)
VENTRICULAR RATE, ECG03: 107 BPM
WBC # BLD AUTO: 7.8 K/UL (ref 4.6–13.2)

## 2022-05-14 PROCEDURE — 99284 EMERGENCY DEPT VISIT MOD MDM: CPT

## 2022-05-14 PROCEDURE — 83735 ASSAY OF MAGNESIUM: CPT

## 2022-05-14 PROCEDURE — 80053 COMPREHEN METABOLIC PANEL: CPT

## 2022-05-14 PROCEDURE — 93005 ELECTROCARDIOGRAM TRACING: CPT

## 2022-05-14 PROCEDURE — 74011250637 HC RX REV CODE- 250/637: Performed by: EMERGENCY MEDICINE

## 2022-05-14 PROCEDURE — 96365 THER/PROPH/DIAG IV INF INIT: CPT

## 2022-05-14 PROCEDURE — 84443 ASSAY THYROID STIM HORMONE: CPT

## 2022-05-14 PROCEDURE — 74011250636 HC RX REV CODE- 250/636: Performed by: EMERGENCY MEDICINE

## 2022-05-14 PROCEDURE — 85025 COMPLETE CBC W/AUTO DIFF WBC: CPT

## 2022-05-14 RX ORDER — LANOLIN ALCOHOL/MO/W.PET/CERES
400 CREAM (GRAM) TOPICAL
Status: COMPLETED | OUTPATIENT
Start: 2022-05-14 | End: 2022-05-14

## 2022-05-14 RX ORDER — POTASSIUM CHLORIDE 20 MEQ/1
40 TABLET, EXTENDED RELEASE ORAL
Status: COMPLETED | OUTPATIENT
Start: 2022-05-14 | End: 2022-05-14

## 2022-05-14 RX ORDER — LORAZEPAM 0.5 MG/1
0.5 TABLET ORAL
Status: COMPLETED | OUTPATIENT
Start: 2022-05-14 | End: 2022-05-14

## 2022-05-14 RX ORDER — LORAZEPAM 0.5 MG/1
0.5-1 TABLET ORAL
Qty: 5 TABLET | Refills: 0 | Status: SHIPPED | OUTPATIENT
Start: 2022-05-14

## 2022-05-14 RX ORDER — MAGNESIUM SULFATE HEPTAHYDRATE 40 MG/ML
2 INJECTION, SOLUTION INTRAVENOUS ONCE
Status: COMPLETED | OUTPATIENT
Start: 2022-05-14 | End: 2022-05-14

## 2022-05-14 RX ORDER — SODIUM CHLORIDE 9 MG/ML
125 INJECTION, SOLUTION INTRAVENOUS CONTINUOUS
Status: DISCONTINUED | OUTPATIENT
Start: 2022-05-14 | End: 2022-05-14 | Stop reason: HOSPADM

## 2022-05-14 RX ORDER — LANOLIN ALCOHOL/MO/W.PET/CERES
400 CREAM (GRAM) TOPICAL DAILY
Qty: 14 TABLET | Refills: 0 | Status: SHIPPED | OUTPATIENT
Start: 2022-05-14

## 2022-05-14 RX ADMIN — SODIUM CHLORIDE 500 ML: 9 INJECTION, SOLUTION INTRAVENOUS at 14:15

## 2022-05-14 RX ADMIN — LORAZEPAM 0.5 MG: 0.5 TABLET ORAL at 14:12

## 2022-05-14 RX ADMIN — POTASSIUM CHLORIDE 40 MEQ: 1500 TABLET, EXTENDED RELEASE ORAL at 14:12

## 2022-05-14 RX ADMIN — MAGNESIUM SULFATE 2 G: 2 INJECTION INTRAVENOUS at 14:14

## 2022-05-14 RX ADMIN — Medication 400 MG: at 14:12

## 2022-05-14 NOTE — ED PROVIDER NOTES
EMERGENCY DEPARTMENT HISTORY AND PHYSICAL EXAM    1:26 PM      Date: 5/14/2022  Patient Name: Madonna Bethea    History of Presenting Illness     Chief Complaint   Patient presents with    Dizziness         History Provided By: Patient  Location/Duration/Severity/Modifying factors   Patient is a 43-year-old female with history of insulin-dependent diabetes, SVT on oral beta-blockade, pulmonary hypertension, history of hysterectomy, anemia, splenomegaly, chronic gastrointestinal disorder, and asthma, the presents emergency department with a complaint of developing a heart rate over 200 while during a stressful event at home. The patient said the police were called and there was something extremely stressful at home that she would not elaborate on and that her heart rate went over 200 and could not get controlled. Patient often times will control her heart rate with vagal maneuvers and cannot do that this time. The patient ultimately was able to get her heart rate down over time however felt like she was in SVT for longer period time than usual.  The patient takes metoprolol he had has a loop recorder in place and took her metoprolol and insulin a bit later than normal today. The patient felt lightheaded when her heart rate was very fast and now she feels like washed out typically if she does have she has an episode of SVT. The patient denies any other aggravating alleviating factors. Patient denies being a smoker, regular drinker, and denies any drug use. The patient works as a  and  for a GI practice locally. The patient cannot record this event using her loop recorder because she does not have a key fob with her.           PCP: Jesu Angel MD    Current Facility-Administered Medications   Medication Dose Route Frequency Provider Last Rate Last Admin    0.9% sodium chloride infusion  125 mL/hr IntraVENous CONTINUOUS Nicki Gibbons MD        sodium chloride 0.9 % bolus infusion 500 mL 500 mL IntraVENous ONCE Rochelle LARKIN  mL/hr at 05/14/22 1415 500 mL at 05/14/22 1415    magnesium sulfate 2 g/50 ml IVPB (premix or compounded)  2 g IntraVENous ONCE Marv Aguilar MD 25 mL/hr at 05/14/22 1414 2 g at 05/14/22 1414     Current Outpatient Medications   Medication Sig Dispense Refill    magnesium oxide (MAG-OX) 400 mg tablet Take 1 Tablet by mouth daily. 14 Tablet 0    LORazepam (Ativan) 0.5 mg tablet Take 1-2 Tablets by mouth every eight (8) hours as needed for Anxiety. Max Daily Amount: 3 mg. 5 Tablet 0    amitriptyline (ELAVIL) 10 mg tablet 1 tab(s)      True Metrix Glucose Test Strip strip AS DIRECTED IN VITRO TWICE A DAY 90 DAYS      colestipoL (Colestid) 1 gram tablet 2 tab(s) (Patient not taking: Reported on 4/14/2022)      dicyclomine (BENTYL) 20 mg tablet TAKE 1 TAB(S) BY MOUTH 4 TIMES A DAY AS NEEDED ABDOMINAL PAIN OR DIARRHEA 90 DAYS      estradioL (ESTRACE) 1 mg tablet TAKE 1 TABLET BY MOUTH EVERY DAY AS DIRECTED      FreeStyle Arm[place] 2 Sensor kit AS DIRECTED SUBCUTANEOUSLY EVERY 14 DAYS 90 DAYS      flecainide (TAMBOCOR) 50 mg tablet Take 50 mg by mouth every twelve (12) hours. (Patient not taking: Reported on 4/14/2022)      fluticasone propionate (FLONASE) 50 mcg/actuation nasal spray SPRAY 2 SPRAYS INTO EACH NOSTRIL EVERY DAY**PLEASE SCHEDULE AN APPOINTMENT**      TRUEplus Lancets 30 gauge misc AS DIRECTED NA TWICE A DAY 90 DAYS      methylcellulose (Citrucel) 500 mg tablet 2 tab(s) (Patient not taking: Reported on 4/14/2022)      methylPREDNISolone (MEDROL DOSEPACK) 4 mg tablet TAKE AS DIRECTED ON THE PACKAGE (Patient not taking: Reported on 4/14/2022)      omeprazole (PRILOSEC) 40 mg capsule 1 cap(s)      BD Rose 2nd Gen Pen Needle 32 gauge x 5/32\" ndle AS DIRECTED SUBCUTANEOUS DAILY 90 DAYS      methocarbamoL (Robaxin-750) 750 mg tablet Take 1 Tablet by mouth four (4) times daily.  (Patient not taking: Reported on 4/14/2022) 20 Tablet 0    lidocaine (Lidoderm) 5 % Apply patch to the affected area for 12 hours a day and remove for 12 hours a day. (Patient not taking: Reported on 4/14/2022) 15 Each 0    scopolamine (TRANSDERM-SCOP) 1 mg over 3 days pt3d 1 Patch by TransDERmal route every seventy-two (72) hours.  semaglutide (OZEMPIC) 1 mg/dose (2 mg/1.5 mL) sub-q pen 1 mg by SubCUTAneous route every seven (7) days.  insulin glargine (Lantus U-100 Insulin) 100 unit/mL injection 22 Units by SubCUTAneous route nightly.  albuterol (PROVENTIL HFA, VENTOLIN HFA, PROAIR HFA) 90 mcg/actuation inhaler Take 2 Puffs by inhalation every four (4) hours as needed for Wheezing or Shortness of Breath. 1 Each 0    empagliflozin (Jardiance) 10 mg tablet Take 10 mg by mouth daily.  multivitamin (ONE A DAY) tablet Take 1 Tab by mouth daily.  ondansetron (ZOFRAN ODT) 4 mg disintegrating tablet Take 1 Tab by mouth every eight (8) hours as needed for Nausea. (Patient not taking: Reported on 1/26/2022) 10 Tab 0    liraglutide (VICTOZA) 0.6 mg/0.1 mL (18 mg/3 mL) pnij 1.2 mg by SubCUTAneous route nightly.  metoprolol succinate (TOPROL-XL) 25 mg XL tablet Take  by mouth daily.          Past History     Past Medical History:  Past Medical History:   Diagnosis Date    Abdominal pain     Adverse effect of anesthesia     SOB    Anemia     Anxiety     Arrhythmia     SVT    Asthma     Bronchitis     Chest pain     Crohn's disease ([facility] [address]     Diabetes (HCC)     borderline    Diarrhea     Dizziness     Inflammatory bowel diseases (IBD)     Kidney stones     Nausea and vomiting     PONV (postoperative nausea and vomiting)     Pulmonary HTN (HCC)     Renal impairment     Renal impairment kidney stones, chronic kidney infection    SV[facility]ra[facility]ricular tachycardia) ([facility])        Past Surgical History:  Past Surgical History:   Procedure Laterality Date    COLONOSCOPY N/A 10/21/2019    DIAGNOSTIC COLONOSCOPY w/ Bx performed by Corina Destini Quevedo MD at [facility] HX APPENDECTOMY      HX  SECTION      HX DILATION AND CURETTAGE      HX HYSTEROSCOPY WITH ENDOMETRIAL ABLATION      HX LAP CHOLECYSTECTOMY      HX LITHOTRIPSY      HX PARTIAL HYSTERECTOMY  2019    VT CARDIAC SURG PROCEDURE UNLIST  10/2019    loop recoder       Family History:  Family History   Problem Relation Age of Onset   [place] Current Stroke Mother     Diabetes Mother     Heart Disease Mother     Cancer Father         prostate    Dementia Father     Parkinson's Disease Father     Kidney Disease Father     Hypertension Other        Social History:  Social History     Tobacco Use    Smoking status: Never Smoker    Smokeless tobacco: Never Used   Vaping Use    Vaping Use: Never used   Substance Use Topics    Alcohol use: Yes     Comment: once or twice a year    Drug use: Never       Allergies: Allergies   Allergen Reactions    Seafood Anaphylaxis     Scallops only    Amoxicillin Hives and Nausea and Vomiting    Erythromycin Hives and Nausea and Vomiting    Hydromorphone Other (comments)     Chest tightness, warm sensation to neck and face, tachycardia, palpitations, hallucinations.  Keflex [Cephalexin] Hives and Nausea and Vomiting    Sulfa (Sulfonamide Antibiotics) Hives and Nausea and Vomiting         Review of Systems       Review of Systems   Constitutional: Positive for activity change. Negative for fatigue and fever. HENT: Negative for congestion and rhinorrhea. Eyes: Negative for visual disturbance. Respiratory: Negative for shortness of breath. Cardiovascular: Positive for palpitations. Negative for chest pain. Gastrointestinal: Negative for abdominal pain, diarrhea, nausea and vomiting. Genitourinary: Negative for dysuria and hematuria. Musculoskeletal: Negative for back pain. Skin: Negative for rash. Neurological: Positive for dizziness, weakness and light-headedness.    Psychiatric/Behavioral: The patient is nervous/anxious. All other systems reviewed and are negative. Physical Exam     Visit Vitals  BP (!) 139/93   Pulse (!) 116   Temp 98.5 °F (36.9 °C)   Resp 16   Ht 5' 7\" (1.702 m)   Wt 119.3 kg (263 lb)   LMP 10/18/2019   SpO2 99%   BMI 41.19 kg/m²         Physical Exam  Vitals and nursing note reviewed. Constitutional:       General: She is not in acute distress. Appearance: She is well-developed. Comments: Steady gait   HENT:      Head: Normocephalic and atraumatic. Right Ear: External ear normal.      Left Ear: External ear normal.      Nose: Nose normal.   Eyes:      General: No scleral icterus. Conjunctiva/sclera: Conjunctivae normal.      Pupils: Pupils are equal, round, and reactive to light. Neck:      Thyroid: No thyromegaly. Vascular: No JVD. Trachea: No tracheal deviation. Cardiovascular:      Rate and Rhythm: Regular rhythm. Tachycardia present. Heart sounds: Normal heart sounds. No murmur heard. No friction rub. No gallop. Pulmonary:      Effort: Pulmonary effort is normal.      Breath sounds: Normal breath sounds. Chest:      Chest wall: No tenderness. Abdominal:      General: Bowel sounds are normal. There is no distension. Palpations: Abdomen is soft. Tenderness: There is no abdominal tenderness. There is no guarding or rebound. Musculoskeletal:         General: No tenderness. Normal range of motion. Cervical back: Normal range of motion and neck supple. Lymphadenopathy:      Cervical: No cervical adenopathy. Skin:     General: Skin is warm and dry. Neurological:      Mental Status: She is alert and oriented to person, place, and time. Cranial Nerves: No cranial nerve deficit. Coordination: Coordination normal.      Comments: Symmetric strength, gait steady, no arm or leg drift   Psychiatric:         Behavior: Behavior normal.         Thought Content:  Thought content normal.         Judgment: Judgment normal. Comments: Supportive friend at the bedside, patient states she has support at home. Diagnostic Study Results     Labs -  Recent Results (from the past 12 hour(s))   EKG, 12 LEAD, INITIAL    Collection Time: 05/14/22 12:51 PM   Result Value Ref Range    Ventricular Rate 107 BPM    Atrial Rate 107 BPM    P-R Interval 162 ms    QRS Duration 80 ms    Q-T Interval 338 ms    QTC Calculation (Bezet) 451 ms    Calculated P Axis 35 degrees    Calculated R Axis 18 degrees    Calculated T Axis 22 degrees    Diagnosis       Sinus tachycardia  Otherwise normal ECG  When compared with ECG of 17-JUN-2019 01:32,  No significant change was found  Confirmed by Senia Mccord MD, Praveen Otero ([id number]) on 5/14/2022 2:36:03 PM     CBC WITH AUTOMATED DIFF    Collection Time: 05/14/22 12:55 PM   Result Value Ref Range    WBC 7.8 4.6 - 13.2 K/uL    RBC 4.43 4.20 - 5.30 M/uL    HGB 13.3 12.0 - 16.0 g/dL    HCT 38.6 35.0 - 45.0 %    MCV 87.1 78.0 - 100.0 FL    MCH 30.0 24.0 - 34.0 PG    MCHC 34.5 31.0 - 37.0 g/dL    RDW 12.8 11.6 - 14.5 %    PLATELET 869 958 - 392 K/uL    MPV 10.9 9.2 - 11.8 FL    NRBC 0.0 0  WBC    ABSOLUTE NRBC 0.00 0.00 - 0.01 K/uL    NEUTROPHILS 67 40 - 73 %    LYMPHOCYTES 26 21 - 52 %    MONOCYTES 5 3 - 10 %    EOSINOPHILS 1 0 - 5 %    BASOPHILS 1 0 - 2 %    IMMATURE GRANULOCYTES 0 0.0 - 0.5 %    ABS. NEUTROPHILS 5.2 1.8 - 8.0 K/UL    ABS. LYMPHOCYTES 2.1 0.9 - 3.6 K/UL    ABS. MONOCYTES 0.4 0.05 - 1.2 K/UL    ABS. EOSINOPHILS 0.1 0.0 - 0.4 K/UL    ABS. BASOPHILS 0.0 0.0 - 0.1 K/UL    ABS. IMM.  GRANS. 0.0 0.00 - 0.04 K/UL    DF AUTOMATED     METABOLIC PANEL, COMPREHENSIVE    Collection Time: 05/14/22 12:55 PM   Result Value Ref Range    Sodium 137 136 - 145 mmol/L    Potassium 3.5 3.5 - 5.5 mmol/L    Chloride 104 100 - 111 mmol/L    CO2 25 21 - 32 mmol/L    Anion gap 8 3.0 - 18 mmol/L    Glucose 168 (H) 74 - 99 mg/dL    BUN 11 7.0 - 18 MG/DL    Creatinine 0.69 0.6 - 1.3 MG/DL    BUN/Creatinine ratio 16 12 - 20 GFR est AA >60 >60 ml/min/1.73m2    GFR est non-AA >60 >60 ml/min/1.73m2    Calcium 9.9 8.5 - 10.1 MG/DL    Bilirubin, total 1.7 (H) 0.2 - 1.0 MG/DL    ALT (SGPT) 49 13 - 56 U/L    AST (SGOT) 24 10 - 38 U/L    Alk. phosphatase 73 45 - 117 U/L    Protein, total 7.8 6.4 - 8.2 g/dL    Albumin 4.0 3.4 - 5.0 g/dL    Globulin 3.8 2.0 - 4.0 g/dL    A-G Ratio 1.1 0.8 - 1.7     MAGNESIUM    Collection Time: 05/14/22 12:55 PM   Result Value Ref Range    Magnesium 1.4 (L) 1.6 - 2.6 mg/dL   TSH 3RD GENERATION    Collection Time: 05/14/22 12:55 PM   Result Value Ref Range    TSH 1.90 0.36 - 3.74 uIU/mL       Radiologic Studies -   No orders to display         Medical Decision Making   I am the first provider for this patient. I reviewed the vital signs, available nursing notes, past medical history, past surgical history, family history and social history. Vital Signs-Reviewed the patient's vital signs. EKG: Sinus tachycardia at 107, no STEMI, interpreted by me    Records Reviewed: Nursing Notes, Old Medical Records, Previous Radiology Studies and Previous Laboratory Studies (Time of Review: 1:26 PM)    ED Course: Progress Notes, Reevaluation, and Consults:      Patient's had 3 episodes of tachycardia with a rate of 170 that occurred around 10:30 AM per [employer]. They cannot see the onset but appears it may be a gradual onset and suspicious for sinus tachycardia. Will continue to follow patient. Myriam Weston, DO 2:20 PM    Patient has mild hypokalemia and was given IV magnesium and p.o. magnesium as well as 1 dose of oral potassium as her potassium was low normal.  The patient has great insight to her care and is feeling much better after Ativan and her magnesium. Do not suspect ACS, pulmonary embolism, or intracranial process at this time. Patient will follow closely as an outpatient and return if at all worsened or concerned. Patient feels safe going home with her friend is at the bedside.     Workup and recommendations were reviewed with the patient and all questions were answered. The patient understands the plan and will proceed with close outpatient care. I have encouraged the patient to return if at all worsened or concerned. Aden Zavala,  3:03 PM        Provider Notes (Medical Decision Making):   MDM  Number of Diagnoses or Management Options  Diagnosis management comments: Patient is a 51-year-old female with a history of multiple chronic medical problems including diabetes on insulin, PSVT with a loop recorder in place on metoprolol, chronic gastrointestinal disease, splenomegaly, history of cholecystectomy appendectomy, hysterectomy, who presents emergency department with a complaint of palpitations and lightheadedness and concern for SVT that is now resolved. The patient is nonfocal at this time but says she feels anxious and weak and said this is the way she feels after she has an episode of SVT. We will follow patient's electrolytes, supportive care with Ativan, attempt to interrogate her loop recorder, and reevaluate. Procedures          Diagnosis     Clinical Impression:   1. PSVT (paroxysmal supraventricular tachycardia) ([address]    2. Anxiety state    3. Hypomagnesemia        Disposition: DC    Follow-up Information     Follow up With Specialties Details Why Contact Info    Olga Lidia Lyman MD Family Medicine In 2 days  [address] [facility]  [phone]      Your cardiologist  In 2 days Discussed her medications with the cardiologist, and asked them to review the loop recorder results     [facility] DEPT Emergency Medicine  As needed, If symptoms worsen [address] [place] [id number]  [phone]           Patient's Medications   Start Taking    LORAZEPAM (ATIVAN) 0.5 MG TABLET    Take 1-2 Tablets by mouth every eight (8) hours as needed for Anxiety. Max Daily Amount: 3 mg.     MAGNESIUM OXIDE (MAG-OX) 400 MG TABLET    Take 1 Tablet by mouth daily. Continue Taking    ALBUTEROL (PROVENTIL HFA, VENTOLIN HFA, PROAIR HFA) 90 MCG/ACTUATION INHALER    Take 2 Puffs by inhalation every four (4) hours as needed for Wheezing or Shortness of Breath. AMITRIPTYLINE (ELAVIL) 10 MG TABLET    1 tab(s)    BD GRACIE 2ND GEN PEN NEEDLE 32 GAUGE X 5/32\" NDLE    AS DIRECTED SUBCUTANEOUS DAILY 90 DAYS    COLESTIPOL (COLESTID) 1 GRAM TABLET    2 tab(s)    DICYCLOMINE (BENTYL) 20 MG TABLET    TAKE 1 TAB(S) BY MOUTH 4 TIMES A DAY AS NEEDED ABDOMINAL PAIN OR DIARRHEA 90 DAYS    EMPAGLIFLOZIN (JARDIANCE) 10 MG TABLET    Take 10 mg by mouth daily. ESTRADIOL (ESTRACE) 1 MG TABLET    TAKE 1 TABLET BY MOUTH EVERY DAY AS DIRECTED    FLECAINIDE (TAMBOCOR) 50 MG TABLET    Take 50 mg by mouth every twelve (12) hours. FLUTICASONE PROPIONATE (FLONASE) 50 MCG/ACTUATION NASAL SPRAY    SPRAY 2 SPRAYS INTO EACH NOSTRIL EVERY DAY**PLEASE SCHEDULE AN APPOINTMENT**    FREESTYLE DEVYN 2 SENSOR KIT    AS DIRECTED SUBCUTANEOUSLY EVERY 14 DAYS 90 DAYS    INSULIN GLARGINE (LANTUS U-100 INSULIN) 100 UNIT/ML INJECTION    22 Units by SubCUTAneous route nightly. LIDOCAINE (LIDODERM) 5 %    Apply patch to the affected area for 12 hours a day and remove for 12 hours a day. LIRAGLUTIDE (VICTOZA) 0.6 MG/0.1 ML (18 MG/3 ML) PNIJ    1.2 mg by SubCUTAneous route nightly. METHOCARBAMOL (ROBAXIN-750) 750 MG TABLET    Take 1 Tablet by mouth four (4) times daily. METHYLCELLULOSE (CITRUCEL) 500 MG TABLET    2 tab(s)    METHYLPREDNISOLONE (MEDROL DOSEPACK) 4 MG TABLET    TAKE AS DIRECTED ON THE PACKAGE    METOPROLOL SUCCINATE (TOPROL-XL) 25 MG XL TABLET    Take  by mouth daily. MULTIVITAMIN (ONE A DAY) TABLET    Take 1 Tab by mouth daily. OMEPRAZOLE (PRILOSEC) 40 MG CAPSULE    1 cap(s)    ONDANSETRON (ZOFRAN ODT) 4 MG DISINTEGRATING TABLET    Take 1 Tab by mouth every eight (8) hours as needed for Nausea.     SCOPOLAMINE (TRANSDERM-SCOP) 1 MG OVER 3 DAYS PT3D    1 Patch by TransDERmal route every seventy-two (72) hours. SEMAGLUTIDE (OZEMPIC) 1 MG/DOSE (2 MG/1.5 ML) SUB-Q PEN    1 mg by SubCUTAneous route every seven (7) days. TRUE METRIX GLUCOSE TEST STRIP STRIP    AS DIRECTED IN VITRO TWICE A DAY 90 DAYS    TRUEPLUS LANCETS 30 GAUGE MISC    AS DIRECTED NA TWICE A DAY 90 DAYS   These Medications have changed    No medications on file   Stop Taking    No medications on file     Disclaimer: Sections of this note are dictated using utilizing voice recognition software. Minor typographical errors may be present. If questions arise, please do not hesitate to contact me or call our department.

## 2022-05-14 NOTE — ED TRIAGE NOTES
Pt complaining of dizziness. HX: SVT. States she is under alot of stress. Per medic pt is sinus tach 112 on monitor. Pt took her metoprolol approx 45 mins ago.

## 2022-05-14 NOTE — DISCHARGE INSTRUCTIONS
Make sure to take your medications as directed and to follow closely with your primary doctors. I have given you some medication for anxiety if you should have an episode we feel very anxious he can take that to help calm down. Please return if you are at all worsened or concerned.

## 2024-09-12 ENCOUNTER — OFFICE VISIT (OUTPATIENT)
Age: 39
End: 2024-09-12
Payer: MEDICAID

## 2024-09-12 VITALS — WEIGHT: 268 LBS | HEIGHT: 68 IN | BODY MASS INDEX: 40.62 KG/M2

## 2024-09-12 DIAGNOSIS — G56.03 BILATERAL CARPAL TUNNEL SYNDROME: Primary | ICD-10-CM

## 2024-09-12 PROCEDURE — 99203 OFFICE O/P NEW LOW 30 MIN: CPT | Performed by: ORTHOPAEDIC SURGERY

## 2024-09-12 RX ORDER — DULAGLUTIDE 0.75 MG/.5ML
INJECTION, SOLUTION SUBCUTANEOUS
COMMUNITY
Start: 2024-08-24

## 2024-09-12 RX ORDER — ESTRADIOL 1 MG/1
1 TABLET ORAL DAILY
COMMUNITY

## 2024-09-12 RX ORDER — LISINOPRIL 5 MG/1
5 TABLET ORAL DAILY
COMMUNITY
Start: 2024-08-24 | End: 2024-11-22

## 2024-09-12 RX ORDER — ERGOCALCIFEROL 1.25 MG/1
50000 CAPSULE, LIQUID FILLED ORAL WEEKLY
COMMUNITY
Start: 2024-08-24

## 2024-09-12 RX ORDER — INSULIN GLARGINE 100 [IU]/ML
INJECTION, SOLUTION SUBCUTANEOUS
COMMUNITY

## 2024-09-12 RX ORDER — BLOOD SUGAR DIAGNOSTIC
STRIP MISCELLANEOUS
COMMUNITY

## 2024-09-12 RX ORDER — INSULIN LISPRO 100 [IU]/ML
INJECTION, SOLUTION INTRAVENOUS; SUBCUTANEOUS
COMMUNITY
Start: 2024-08-29

## 2024-09-18 ENCOUNTER — PROCEDURE VISIT (OUTPATIENT)
Age: 39
End: 2024-09-18
Payer: MEDICAID

## 2024-09-18 VITALS
SYSTOLIC BLOOD PRESSURE: 110 MMHG | HEART RATE: 81 BPM | OXYGEN SATURATION: 93 % | DIASTOLIC BLOOD PRESSURE: 77 MMHG | HEIGHT: 68 IN | WEIGHT: 264.4 LBS | BODY MASS INDEX: 40.07 KG/M2 | TEMPERATURE: 97.2 F

## 2024-09-18 DIAGNOSIS — R20.0 NUMBNESS AND TINGLING IN BOTH HANDS: Primary | ICD-10-CM

## 2024-09-18 DIAGNOSIS — R20.2 NUMBNESS AND TINGLING IN BOTH HANDS: Primary | ICD-10-CM

## 2024-09-18 PROCEDURE — 95886 MUSC TEST DONE W/N TEST COMP: CPT | Performed by: PHYSICAL MEDICINE & REHABILITATION

## 2024-09-18 PROCEDURE — 95911 NRV CNDJ TEST 9-10 STUDIES: CPT | Performed by: PHYSICAL MEDICINE & REHABILITATION

## 2024-09-25 ENCOUNTER — OFFICE VISIT (OUTPATIENT)
Age: 39
End: 2024-09-25
Payer: MEDICAID

## 2024-09-25 VITALS — WEIGHT: 263 LBS | BODY MASS INDEX: 39.86 KG/M2 | HEIGHT: 68 IN

## 2024-09-25 DIAGNOSIS — M18.0 PRIMARY OSTEOARTHRITIS OF BOTH FIRST CARPOMETACARPAL JOINTS: ICD-10-CM

## 2024-09-25 DIAGNOSIS — G56.03 BILATERAL CARPAL TUNNEL SYNDROME: Primary | ICD-10-CM

## 2024-09-25 PROCEDURE — 99214 OFFICE O/P EST MOD 30 MIN: CPT | Performed by: ORTHOPAEDIC SURGERY

## 2024-09-25 PROCEDURE — 73110 X-RAY EXAM OF WRIST: CPT | Performed by: ORTHOPAEDIC SURGERY

## 2024-09-25 PROCEDURE — 20526 THER INJECTION CARP TUNNEL: CPT | Performed by: ORTHOPAEDIC SURGERY

## 2024-09-25 RX ORDER — LIDOCAINE HYDROCHLORIDE 10 MG/ML
1 INJECTION, SOLUTION INFILTRATION; PERINEURAL ONCE
Status: COMPLETED | OUTPATIENT
Start: 2024-09-25 | End: 2024-09-25

## 2024-09-25 RX ORDER — DICLOFENAC SODIUM 75 MG/1
75 TABLET, DELAYED RELEASE ORAL EVERY 12 HOURS PRN
Qty: 60 TABLET | Refills: 0 | Status: SHIPPED | OUTPATIENT
Start: 2024-09-25 | End: 2024-10-25

## 2024-09-25 RX ADMIN — LIDOCAINE HYDROCHLORIDE 1 ML: 10 INJECTION, SOLUTION INFILTRATION; PERINEURAL at 14:56
